# Patient Record
(demographics unavailable — no encounter records)

---

## 2025-04-02 NOTE — ASSESSMENT
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 Here for follow-up.  #Abdominal discomfort - Plan to obtain Hpylori stool test for further evaluation - Given lack of other GI symptoms and negative CT scan would hold off on US abdomen or EGD at this time  #Possible iron deficiency - Will obtain ferritin level to assess for evidence of iron deficiency - If evidence of CARLOS will need to discuss proceeding with EGD and colonoscopy for further evaluation - Iron supplementation under the guidance of PCP (however if ferritin is not low would recommend stopping this)

## 2025-04-02 NOTE — HISTORY OF PRESENT ILLNESS
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 During that visit labs revealed WBC 11.2, hemoglobin 12.5, MCV 93, platelets 264, sodium 136, potassium 4.3, BUN 18, creatinine 1.13, albumin 2.4, lipase 106, negative hCG, unremarkable troponin negative.  Negative UA.  CT abdomen/pelvis with IV contras which revealed a small hiatal hernia, a small hiatal hernia, normal liver, normal gallbladder, normal biliary ducts, normal spleen, normal pancreas, no evidence of bowel obstruction with normal appendix.  Patient presented here with staff from her facility.  Patient reports that her abdomen has been hurting throughout. She describes this pain as sharp and intermittent but not related to food intake or bowel movements. As per facility she usually reports having abdominal discomfort to stay home from the program. She has a bowel movement once every other day which is brown without straining.  She reports that she does not get her menstrual cycle.  She denies any blood in the stool or hematemesis in the past.  Patient has never had an endoscopy or colonoscopy previously.  I did speak to the nurse at her facility who reports that iron levels were checked on 3/11/2025 which revealed iron saturation of 13%, total iron 41, and TIBC of 318.  They did not do a ferritin level at that time.  She was started on IV p.o. iron supplementation shortly after these labs.

## 2025-04-02 NOTE — PHYSICAL EXAM
[Alert] : alert [Normal Voice/Communication] : normal voice/communication [Healthy Appearing] : healthy appearing [No Acute Distress] : no acute distress [Hearing Threshold Finger Rub Not Licking] : hearing was normal [Normal Lips/Gums] : the lips and gums were normal [Normal Appearance] : the appearance of the neck was normal [No Respiratory Distress] : no respiratory distress [No Acc Muscle Use] : no accessory muscle use [Abdomen Tenderness] : non-tender [Abdomen Soft] : soft [Oriented To Time, Place, And Person] : oriented to person, place, and time [de-identified] : no tenderness even on deep palpation

## 2025-07-07 NOTE — ASSESSMENT
[FreeTextEntry1] : Patient is a 38 yo F with a history of intellectual disability, fetal alcohol syndrome, psych issues with intermittent explosive behavior on risperdal and depakote, GERD on pepcid here for follow-up.  #Possible iron deficiency - Labs ordered during last visit were not obtained  - Repeat labs today - If evidence of CARLOS will need to discuss proceeding with EGD and colonoscopy for further evaluation - Iron supplementation under the guidance of PCP (however if ferritin is not low would recommend stopping this).  #Abdominal discomfort - intermittent and not reporting today - Negative Hpylori stool test - Given lack of other GI symptoms and negative CT scan would hold off on US abdomen or EGD at this time  Follow-up after bloodwork

## 2025-07-07 NOTE — HISTORY OF PRESENT ILLNESS
[FreeTextEntry1] : Patient is a 38 yo F with a history of intellectual disability, fetal alcohol syndrome, psych issues with intermittent explosive behavior on risperdal and depakote, GERD on pepcid who was seen by myself for initial visit on 4/2/25 due to abdominal pain.  As per prior notes: She was seen in  on 3/23/25 due to lower sternal chest pain, upper back pain and lower back pain. During that visit labs revealed WBC 11.2, hemoglobin 12.5, MCV 93, platelets 264, sodium 136, potassium 4.3, BUN 18, creatinine 1.13, albumin 2.4, lipase 106, negative hCG, unremarkable troponin negative. Negative UA. CT abdomen/pelvis with IV contras which revealed a small hiatal hernia, a small hiatal hernia, normal liver, normal gallbladder, normal biliary ducts, normal spleen, normal pancreas, no evidence of bowel obstruction with normal appendix. During our last visit she stated her abdomen had been hurting throughout and described this pain as sharp and intermittent but not related to food intake or bowel movements. As per facility she usually reported having abdominal discomfort to stay home from the program. She has a bowel movement once every other day which is brown without straining. Patient has never had an endoscopy or colonoscopy previously. At the last visit I did speak to the nurse at her facility who reported that iron levels were checked on 3/11/2025 which revealed iron saturation of 13%, total iron 41, and TIBC of 318. They did not do a ferritin level at that time. She was started on IV p.o. iron supplementation shortly after these labs - though aide today thinks this is PO iron.  Interval hx: Patient presents again with aide. No medical forms available for review today. She denies any blood in stool or nausea, vomiting. Does not endorse abdominal pain today. Negative Hpylori stool test after last visit. Did not get bloodwork done. As per patient and aide no known family hx of CRC or IBD. Recent labs on 5/10/2025 revealed hemoglobin 11.5, MCV 94, platelets 196, no ferritin.

## 2025-07-07 NOTE — PHYSICAL EXAM
[Alert] : alert [Normal Voice/Communication] : normal voice/communication [Healthy Appearing] : healthy appearing [No Acute Distress] : no acute distress [Sclera] : the sclera and conjunctiva were normal [Hearing Threshold Finger Rub Not Divide] : hearing was normal [Normal Lips/Gums] : the lips and gums were normal [Normal Appearance] : the appearance of the neck was normal [No Respiratory Distress] : no respiratory distress [No Acc Muscle Use] : no accessory muscle use [Abdomen Tenderness] : non-tender [Abdomen Soft] : soft [Oriented To Time, Place, And Person] : oriented to person, place, and time